# Patient Record
Sex: FEMALE | Race: WHITE | NOT HISPANIC OR LATINO | Employment: OTHER | URBAN - NONMETROPOLITAN AREA
[De-identification: names, ages, dates, MRNs, and addresses within clinical notes are randomized per-mention and may not be internally consistent; named-entity substitution may affect disease eponyms.]

---

## 2017-02-11 ENCOUNTER — OFFICE VISIT (OUTPATIENT)
Dept: RETAIL CLINIC | Facility: CLINIC | Age: 72
End: 2017-02-11

## 2017-02-11 VITALS
HEIGHT: 62 IN | TEMPERATURE: 98.6 F | HEART RATE: 76 BPM | DIASTOLIC BLOOD PRESSURE: 68 MMHG | OXYGEN SATURATION: 99 % | WEIGHT: 172.4 LBS | BODY MASS INDEX: 31.73 KG/M2 | RESPIRATION RATE: 18 BRPM | SYSTOLIC BLOOD PRESSURE: 112 MMHG

## 2017-02-11 DIAGNOSIS — J01.00 ACUTE NON-RECURRENT MAXILLARY SINUSITIS: Primary | ICD-10-CM

## 2017-02-11 PROCEDURE — 99213 OFFICE O/P EST LOW 20 MIN: CPT | Performed by: NURSE PRACTITIONER

## 2017-02-11 RX ORDER — AMOXICILLIN AND CLAVULANATE POTASSIUM 875; 125 MG/1; MG/1
1 TABLET, FILM COATED ORAL EVERY 12 HOURS SCHEDULED
Status: SHIPPED | OUTPATIENT
Start: 2017-02-11 | End: 2017-02-21

## 2017-02-11 RX ORDER — BENZONATATE 200 MG/1
200 CAPSULE ORAL 3 TIMES DAILY PRN
Qty: 21 CAPSULE | Refills: 0 | Status: SHIPPED | OUTPATIENT
Start: 2017-02-11

## 2017-02-11 NOTE — PROGRESS NOTES
Subjective   Ivet Contreras is a 71 y.o. female.     History of Present Illness She is accompanied  By her .  She has been ill for a couple of weeks, though symptoms have waxed and waned.  Started with sinus pain,pressure and cough.  Got better then developed NV, then developed sinus pressure, HA,ear pain, Scratchy throat and NP cough.  Not taking anything for S/S.  Works with the elderly    No current outpatient prescriptions on file prior to visit.     No current facility-administered medications on file prior to visit.        Allergies   Allergen Reactions   • Codeine      Headache     • Toviaz [Fesoterodine Fumarate Er] Angioedema       Past Medical History   Diagnosis Date   • Acid reflux    • Anxiety    • Asthma    • CAD (coronary artery disease)    • CHF (congestive heart failure)    • COPD (chronic obstructive pulmonary disease)    • Depression    • Diabetic neuropathy      bilateral feet   • Elevated cholesterol    • Heart attack      x3   • Hypertension    • Overactive bladder    • Stroke      multiple TIA's   • Type 2 diabetes mellitus        History reviewed. No pertinent past surgical history.    Family History   Problem Relation Age of Onset   • Cancer Mother    • Heart disease Father    • Lung disease Father        Social History     Social History   • Marital status:      Spouse name: N/A   • Number of children: N/A   • Years of education: N/A     Occupational History   • Not on file.     Social History Main Topics   • Smoking status: Passive Smoke Exposure - Never Smoker   • Smokeless tobacco: Not on file   • Alcohol use No   • Drug use: Not on file   • Sexual activity: Not on file     Other Topics Concern   • Not on file     Social History Narrative   • No narrative on file       Review of Systems   Constitutional: Positive for fever. Negative for activity change and appetite change.   HENT: Positive for sinus pressure. Negative for sore throat.    Eyes: Negative for discharge.  "  Respiratory: Negative for cough, choking, chest tightness, shortness of breath, wheezing and stridor.    Cardiovascular: Negative.        Visit Vitals   • /68 (BP Location: Right arm)   • Pulse 76   • Temp 98.6 °F (37 °C) (Oral)   • Resp 18   • Ht 62\" (157.5 cm)   • Wt 172 lb 6.4 oz (78.2 kg)   • SpO2 99%   • BMI 31.53 kg/m2       Objective   Physical Exam   Constitutional: She is oriented to person, place, and time. She appears well-developed and well-nourished. No distress.   HENT:   Head: Normocephalic.   Tender to bilat maxillary sinuses.  Throat without redness, exudate. TM dull    Eyes: Right eye exhibits no discharge. Left eye exhibits no discharge.   Neck: Neck supple.   Cardiovascular: Normal rate, regular rhythm and normal heart sounds.  Exam reveals no gallop and no friction rub.    No murmur heard.  Pulmonary/Chest: Effort normal and breath sounds normal. No respiratory distress. She has no wheezes.   Lymphadenopathy:     She has no cervical adenopathy.   Neurological: She is alert and oriented to person, place, and time.   Skin: Skin is warm and dry.   Pink, no rash   Psychiatric: She has a normal mood and affect. Her behavior is normal. Judgment and thought content normal.   Nursing note and vitals reviewed.      Assessment/Plan   Ivet was seen today for uri.    Diagnoses and all orders for this visit:    Acute non-recurrent maxillary sinusitis  -     amoxicillin-clavulanate (AUGMENTIN) 875-125 MG per tablet 1 tablet; Take 1 tablet by mouth Every 12 (Twelve) Hours.    Warm moist cloths to face  See Dinah LEMON in 1 week for F/U        "

## 2022-09-26 ENCOUNTER — OFFICE VISIT (OUTPATIENT)
Dept: FAMILY MEDICINE CLINIC | Facility: CLINIC | Age: 77
End: 2022-09-26

## 2022-09-26 VITALS
OXYGEN SATURATION: 98 % | TEMPERATURE: 97.7 F | BODY MASS INDEX: 30.66 KG/M2 | HEIGHT: 62 IN | WEIGHT: 166.6 LBS | SYSTOLIC BLOOD PRESSURE: 142 MMHG | DIASTOLIC BLOOD PRESSURE: 80 MMHG | HEART RATE: 64 BPM

## 2022-09-26 DIAGNOSIS — R41.3 MEMORY DEFICIT: ICD-10-CM

## 2022-09-26 DIAGNOSIS — R06.02 SHORTNESS OF BREATH: ICD-10-CM

## 2022-09-26 DIAGNOSIS — J45.50 SEVERE PERSISTENT ASTHMA, UNSPECIFIED WHETHER COMPLICATED: ICD-10-CM

## 2022-09-26 DIAGNOSIS — E11.65 UNCONTROLLED TYPE 2 DIABETES MELLITUS WITH HYPERGLYCEMIA: Primary | ICD-10-CM

## 2022-09-26 LAB
EXPIRATION DATE: NORMAL
HBA1C MFR BLD: 9.9 %
Lab: NORMAL

## 2022-09-26 PROCEDURE — 99204 OFFICE O/P NEW MOD 45 MIN: CPT | Performed by: FAMILY MEDICINE

## 2022-09-26 PROCEDURE — 83036 HEMOGLOBIN GLYCOSYLATED A1C: CPT | Performed by: FAMILY MEDICINE

## 2022-09-26 PROCEDURE — 3046F HEMOGLOBIN A1C LEVEL >9.0%: CPT | Performed by: FAMILY MEDICINE

## 2022-09-26 RX ORDER — ROSUVASTATIN CALCIUM 20 MG/1
40 TABLET, COATED ORAL DAILY
COMMUNITY

## 2022-09-26 RX ORDER — LOSARTAN POTASSIUM 50 MG/1
100 TABLET ORAL DAILY
COMMUNITY

## 2022-09-26 RX ORDER — INSULIN ASPART 100 [IU]/ML
60 INJECTION, SUSPENSION SUBCUTANEOUS 2 TIMES DAILY WITH MEALS
COMMUNITY
End: 2022-10-05 | Stop reason: SDUPTHER

## 2022-09-26 RX ORDER — BACLOFEN 10 MG/1
10 TABLET ORAL 2 TIMES DAILY
COMMUNITY

## 2022-09-26 RX ORDER — CYCLOBENZAPRINE HCL 5 MG
5 TABLET ORAL 2 TIMES DAILY PRN
Qty: 60 TABLET | Refills: 2 | Status: SHIPPED | OUTPATIENT
Start: 2022-09-26

## 2022-09-26 NOTE — PATIENT INSTRUCTIONS
Call endocrinologist's office to get a new appointment  Start new medication for sugar (Jardiance)  Call pulmonologist for breathing medication recommendations  Have echo completed (they will call you)

## 2022-09-26 NOTE — PROGRESS NOTES
New Patient Office Visit      Patient Name: Ivet Contreras  : 1945   MRN: [id number]     Chief Complaint:    Chief Complaint   Patient presents with   • Establish Care     Asthma, DM       Subjective   History of Present Illness    History of Present Illness: Ivet Contreras is a 77 y.o. female who is here today to establish care      Previous primary care: Sujatha Saha APRN at [facility] in [place]    Other physicians currently involved in patient's care:  Patient Care Team:  Nikita Bryan DO as PCP - General (Family Medicine)  Dinah Avina PA (Obstetrics and Gynecology)  Otilio Pino MD (Pulmonary Disease)    Acute Concerns:  HPI  The patient is accompanied by an adult male today. She shares that she has seen Sujatha Saha previously but says that her various conditions have been worsening. Per the adult male, the patient had COVID-19 in  and has received the Pfizer vaccine. He additionally reports she has history of diabetes mellitus type 2. He states the patient was diagnosed with asthma last week by Dr. Parker of pulmonology, and says she was previously being treated for COPD. The patient has also received referrals to endocrinology for her diabetes and neurology for memory issues. The adult male reports that he monitors the patient's blood glucose at home, reporting a reading of 265 mg/dL this morning. She is currently prescribed Humulin 70/30 and NovoLog 60 units, twice daily.     The patient reports shortness of breath and notes that she has been having anxiety attacks since this began. The patient is allergic to albuterol and is in need of recommendation for breathing medication from Dr. Parker. The patient shares that she can hardly stand food and when she eats she becomes nauseous, stating that her sense of taste has been abnormal since having COVID-19. She currently takes a metoprolol and valsartan combination pill 10 mg and has discontinued alprazolam and  gabapentin. The patient's A1c today is 9.9%, and she was unable to attend her endocrinology appointment in 08/2022. She is not currently taking a pill for her blood glucose due to previously taking Januvia and developing gallstones. The patient reports being told she has congestive heart failure in the distant past and has never received an echocardiogram. She requests a prescription for Flexeril.         This patient is accompanied by their  who contributes to the history of their care.    The following portions of the patient's history were reviewed and updated as appropriate: allergies, current medications, past family history, past medical history, past social history, past surgical history and problem list.    Subjective      Review of Systems:   Review of Systems - See HPI and new patient paperwork scanned into chart    Past Medical History:   Past Medical History:   Diagnosis Date   • Acid reflux    • Anxiety    • Asthma    • CAD (coronary artery disease)    • CHF (congestive heart failure) ([facility])    • COPD (chronic obstructive pulmonary disease) (HCC)    • Depression    • Diabetic neuropathy (HCC)     bilateral feet   • Elevated cholesterol    • Heart attack ([facility])     x3   • Hypertension    • Overactive bladder    • Stroke ([facility])     multiple TIA's   • Type 2 diabetes mellitus (HCC)        Past Surgical History: No past surgical history on file.    Family History:   Family History   Problem Relation Age of Onset   • Cancer Mother    • Heart disease Father    • Lung disease Father        Social History:   Social History     Socioeconomic History   • Marital status:    Tobacco Use   • Smoking status: Passive Smoke Exposure - Never Smoker   • Smokeless tobacco: Never   Substance and Sexual Activity   • Alcohol use: No       Tobacco History:   Social History     Tobacco Use   Smoking Status Passive Smoke Exposure - Never Smoker   Smokeless Tobacco Never       Medications:     Current Outpatient  Medications:   •  aspirin 81 MG tablet, Take 81 mg by mouth Daily., Disp: , Rfl:   •  baclofen (LIORESAL) 10 MG tablet, Take 10 mg by mouth 2 (Two) Times a Day., Disp: , Rfl:   •  benzonatate (TESSALON) 200 MG capsule, Take 1 capsule by mouth 3 (Three) Times a Day As Needed for cough., Disp: 21 capsule, Rfl: 0  •  BUSPIRONE HCL PO, Take  by mouth., Disp: , Rfl:   •  Docosahexaenoic Acid (DHA COMPLETE PO), Take  by mouth., Disp: , Rfl:   •  empagliflozin (Jardiance) 25 MG tablet tablet, Take 1 tablet by mouth Daily., Disp: 90 tablet, Rfl: 3  •  losartan (COZAAR) 50 MG tablet, Take 100 mg by mouth Daily., Disp: , Rfl:   •  METFORMIN HCL PO, Take  by mouth., Disp: , Rfl:   •  METOPROLOL SUCCINATE ER PO, Take  by mouth., Disp: , Rfl:   •  Multiple Vitamins-Minerals (MULTIVITAMIN PO), Take  by mouth., Disp: , Rfl:   •  Omega-3 Fatty Acids (FISH OIL PO), Take  by mouth., Disp: , Rfl:   •  OMEPRAZOLE PO, Take  by mouth., Disp: , Rfl:   •  PAROXETINE HCL PO, Take  by mouth., Disp: , Rfl:   •  ROPINIROLE HCL PO, Take  by mouth., Disp: , Rfl:   •  rosuvastatin (CRESTOR) 20 MG tablet, Take 40 mg by mouth Daily., Disp: , Rfl:   •  VALSARTAN-HYDROCHLOROTHIAZIDE PO, Take  by mouth., Disp: , Rfl:   •  cyclobenzaprine (FLEXERIL) 5 MG tablet, Take 1 tablet by mouth 2 (Two) Times a Day As Needed for Muscle Spasms., Disp: 60 tablet, Rfl: 2  •  Insulin Glargine-Lixisenatide (Soliqua) 100-33 UNT-MCG/ML solution pen-injector injection, Inject 15 Units under the skin into the appropriate area as directed Every Morning Before Breakfast. Should be taken within the hour prior to first meal of day, Disp: 24 mL, Rfl: 0    Allergies:   Allergies   Allergen Reactions   • Fesoterodine Anaphylaxis   • Morphine Anaphylaxis   • Peanut (Diagnostic) Anaphylaxis   • Albuterol Cough   • Codeine      Headache     • Toviaz [Fesoterodine Fumarate Er] Angioedema   • Dimenhydrinate Swelling       Objective   Objective     Physical Exam:  Vital Signs:  "  Vitals:    09/26/22 1354   BP: 142/80   BP Location: Right arm   Patient Position: Sitting   Cuff Size: Adult   Pulse: 64   Temp: 97.7 °F (36.5 °C)   TempSrc: Infrared   SpO2: 98%   Weight: 75.6 kg (166 lb 9.6 oz)   Height: 157.5 cm (62\")     Body mass index is 30.47 kg/m².     Physical Exam  Nursing note reviewed  Const: NAD, A&Ox4, Pleasant, Cooperative  Eyes: EOMI, no conjunctivitis  ENT: No nasal discharge present, neck supple  Cardiac: Regular rate and rhythm, no cyanosis  Resp: Respiratory rate within normal limits, no increased work of breathing, no audible wheezing or retractions noted  GI: No distention or ascites  MSK: Motor and sensation grossly intact in bilateral upper extremities  Neurologic: CN II-XII grossly intact  Psych: Appropriate mood and behavior.  Skin: Warm, dry  Procedures/Radiology     Procedures  No radiology results for the last 7 days     Assessment & Plan   Assessment / Plan      Assessment/Plan:   Problems Addressed This Visit  Diagnoses and all orders for this visit:    1. Uncontrolled type 2 diabetes mellitus with hyperglycemia (HCC) (Primary)  Assessment & Plan:  Lab Results   Component Value Date    HGBA1C 9.9 09/26/2022   Change 70/30 to Soliqua. Difficult control and compliance, I think she would benefit from being on the same medication as her . Start 15U every morning. Also add Jardiance.    Orders:  -     POC Glycosylated Hemoglobin (Hb A1C)  -     empagliflozin (Jardiance) 25 MG tablet tablet; Take 1 tablet by mouth Daily.  Dispense: 90 tablet; Refill: 3  -     Insulin Glargine-Lixisenatide (Soliqua) 100-33 UNT-MCG/ML solution pen-injector injection; Inject 15 Units under the skin into the appropriate area as directed Every Morning Before Breakfast. Should be taken within the hour prior to first meal of day  Dispense: 24 mL; Refill: 0  -     Comprehensive Metabolic Panel; Future  -     TSH Rfx On Abnormal To Free T4; Future  -     C-Peptide; Future  -     " Microalbumin / Creatinine Urine Ratio - Urine, Clean Catch; Future  -     Urinalysis With Microscopic If Indicated (No Culture) - Urine, Clean Catch; Future    2. Memory deficit    3. Shortness of breath  -     Adult Transthoracic Echo Complete W/ Cont if Necessary Per Protocol; Future    4. Severe persistent asthma, unspecified whether complicated    Other orders  -     cyclobenzaprine (FLEXERIL) 5 MG tablet; Take 1 tablet by mouth 2 (Two) Times a Day As Needed for Muscle Spasms.  Dispense: 60 tablet; Refill: 2    Problem List Items Addressed This Visit        Endocrine and Metabolic    Uncontrolled type 2 diabetes mellitus with hyperglycemia (HCC) - Primary    Overview     A1c over 10 May 2022.  She has been referred to endocrinology by her PCP, but was a no-show to this appointment on 8/23/2022.  -Checking FSBS at home sporadically, 200-265  -On 70/30 Novolog mix 60U BID         Current Assessment & Plan     Lab Results   Component Value Date    HGBA1C 9.9 09/26/2022   Change 70/30 to Soliqua. Difficult control and compliance, I think she would benefit from being on the same medication as her . Start 15U every morning. Also add Jardiance.         Relevant Medications    empagliflozin (Jardiance) 25 MG tablet tablet    Insulin Glargine-Lixisenatide (Soliqua) 100-33 UNT-MCG/ML solution pen-injector injection    Other Relevant Orders    POC Glycosylated Hemoglobin (Hb A1C) (Completed)    Comprehensive Metabolic Panel    TSH Rfx On Abnormal To Free T4    C-Peptide    Microalbumin / Creatinine Urine Ratio - Urine, Clean Catch    Urinalysis With Microscopic If Indicated (No Culture) - Urine, Clean Catch       Neuro    Memory deficit    Overview     Was referred to neurology earlier this year by PCP, evidently canceled        Other Visit Diagnoses     Shortness of breath        Relevant Orders    Adult Transthoracic Echo Complete W/ Cont if Necessary Per Protocol    Severe persistent asthma, unspecified whether  complicated              We will plan to obtain previous records both for chronic preventative care as well as those related to the current episode of care.  Any records that the patient brought with her today were reviewed personally by me during the visit today and will be scanned into the chart for posterity.    Discussed the nature of the disease including relevant anatomy & expected clinical course, risks, complications, implications, management, safe and proper use of medications. Plan of care reviewed with patient at the conclusion of today's visit. Education was provided regarding diagnosis and management.  Patient verbalizes understanding of and agreement with management plan. Encouraged therapeutic lifestyle changes including low calorie diet with plenty of fruits and vegetables, daily exercise, medication compliance, and keeping scheduled follow up appointments with me and any other providers. Encouraged patient to have appointment for complete physical, fasting labs, appropriate screenings, and immunizations on an annual basis. Discussed extended office hours, shared call, and appropriate use of the ER. Discussed generally we do not prescribe chronic controlled substances from this office. Appropriate referrals will be made to pain management and psychiatry if needed. Stressed the importance and expectation of medical compliance with plan of care, medications, and follow up appointments.    Patient Instructions   1. Call endocrinologist's office to get a new appointment  2. Start new medication for sugar (Jardiance)  3. Call pulmonologist for breathing medication recommendations  4. Have echo completed (they will call you)    Follow Up:   Return in about 2 weeks (around 10/10/2022) for follow up after testing/imaging.    I spent 47 minutes caring for Ivet on this date of service. This time includes time spent by me in the following activities:preparing for the visit, obtaining and/or reviewing a  separately obtained history, performing a medically appropriate examination and/or evaluation , counseling and educating the patient/family/caregiver, ordering medications, tests, or procedures and documenting information in the medical record    MDM       [facility] [address]  [facility]  [address]  [facility] [address]  Fax [phone]  Phone [phone]      Transcribed from ambient dictation for Nikita Bryan DO by Isaias Fan.  09/26/22   15:13 EDT    Patient verbalized consent to the visit recording.  I have personally performed the services described in this document as transcribed by the above individual, and it is both accurate and complete.  Nikita Bryan DO  10/13/2022  09:59 EDT

## 2022-10-05 NOTE — TELEPHONE ENCOUNTER
Caller: Diane    Relationship: Emergency Contact    Best call back number: [phone]    Requested Prescriptions:   Requested Prescriptions     Pending Prescriptions Disp Refills   • insulin aspart protamine-insulin aspart (novoLOG 70/30) injection       Sig: Inject 60 Units under the skin into the appropriate area as directed 2 (Two) Times a Day With Meals.        Pharmacy where request should be sent: [facility] - [place] [id number] - [phone] [facility] [phone] FX     Additional details provided by patient:     Does the patient have less than a 3 day supply:  [] Yes  [x] No    Dylan Santo Rep   10/05/22 11:14 EDT

## 2022-10-05 NOTE — TELEPHONE ENCOUNTER
Rx Refill Note  Requested Prescriptions     Pending Prescriptions Disp Refills   • insulin aspart protamine-insulin aspart (novoLOG 70/30) injection       Sig: Inject 60 Units under the skin into the appropriate area as directed 2 (Two) Times a Day With Meals.      Last office visit with prescribing clinician: 9/26/2022      Next office visit with prescribing clinician: 10/13/2022            Rell Arce MA  10/05/22, 16:12 EDT

## 2022-10-06 RX ORDER — INSULIN ASPART 100 [IU]/ML
60 INJECTION, SUSPENSION SUBCUTANEOUS 2 TIMES DAILY WITH MEALS
Qty: 36 ML | Refills: 11 | Status: SHIPPED | OUTPATIENT
Start: 2022-10-06 | End: 2022-10-13 | Stop reason: ALTCHOICE

## 2022-10-13 RX ORDER — INSULIN GLARGINE AND LIXISENATIDE 100; 33 U/ML; UG/ML
15 INJECTION, SOLUTION SUBCUTANEOUS
Qty: 24 ML | Refills: 0 | Status: SHIPPED | OUTPATIENT
Start: 2022-10-13

## 2022-11-02 ENCOUNTER — TELEPHONE (OUTPATIENT)
Dept: FAMILY MEDICINE CLINIC | Facility: CLINIC | Age: 77
End: 2022-11-02

## 2022-11-02 NOTE — TELEPHONE ENCOUNTER
Provider: IMELDA GARCÍA    Caller: [facility]       Phone Number: [phone]    Reason for Call:     GOING TO DO SHORT TERM RE-CERT OF THREE WEEKS    PATIENT IS CONTINUING PANIC ATTACKS, BLOOD PRESSURE BEEN A LITTLE INCREASED.  CALL BACK IF NEED TO.

## 2022-11-11 ENCOUNTER — TELEPHONE (OUTPATIENT)
Dept: FAMILY MEDICINE CLINIC | Facility: CLINIC | Age: 77
End: 2022-11-11

## 2022-11-11 NOTE — TELEPHONE ENCOUNTER
Caller: [facility]    Relationship:     Best call back number: [phone]    Additional notes: [facility] SHOWED UP AT THE PATIENT'S HOME TODAY TO OFFER HOME HEALTH AND THE PATIENT DECLINED SERVICES TODAY BECAUSE SHE IS CURRENTLY IN THE MIDDLE OF BOXING UP HER HOME TO MOVE LOCATIONS.

## 2022-11-17 NOTE — TELEPHONE ENCOUNTER
LEVI WITH [facility] CALLED TO REQUEST ANOTHER ORDER FOR HOME HEALTH NURSING VISIT,PT WAS NOT ABLE TO COME IN LAST VISIT.    PLEASE ADVISE.  CALL BACK:[phone]